# Patient Record
Sex: MALE | Race: WHITE | NOT HISPANIC OR LATINO | Employment: OTHER | URBAN - METROPOLITAN AREA
[De-identification: names, ages, dates, MRNs, and addresses within clinical notes are randomized per-mention and may not be internally consistent; named-entity substitution may affect disease eponyms.]

---

## 2023-05-16 RX ORDER — DAPAGLIFLOZIN 10 MG/1
5 TABLET, FILM COATED ORAL DAILY
COMMUNITY
Start: 2021-10-13 | End: 2024-05-28 | Stop reason: SDUPTHER

## 2023-05-16 RX ORDER — ATORVASTATIN CALCIUM 20 MG/1
1 TABLET, FILM COATED ORAL NIGHTLY
COMMUNITY
Start: 2021-07-16 | End: 2024-05-28 | Stop reason: SDUPTHER

## 2023-05-16 RX ORDER — GLIMEPIRIDE 4 MG/1
1 TABLET ORAL 2 TIMES DAILY
COMMUNITY
Start: 2020-04-24 | End: 2024-05-28 | Stop reason: SDUPTHER

## 2023-05-16 RX ORDER — ALOGLIPTIN 25 MG/1
1 TABLET, FILM COATED ORAL DAILY
COMMUNITY
Start: 2020-09-10 | End: 2024-05-28 | Stop reason: SDUPTHER

## 2023-05-19 ENCOUNTER — LAB (OUTPATIENT)
Dept: LAB | Facility: LAB | Age: 68
End: 2023-05-19
Payer: MEDICARE

## 2023-05-19 ENCOUNTER — OFFICE VISIT (OUTPATIENT)
Dept: PRIMARY CARE | Facility: CLINIC | Age: 68
End: 2023-05-19
Payer: MEDICARE

## 2023-05-19 VITALS
SYSTOLIC BLOOD PRESSURE: 128 MMHG | DIASTOLIC BLOOD PRESSURE: 60 MMHG | BODY MASS INDEX: 27.43 KG/M2 | HEART RATE: 70 BPM | TEMPERATURE: 97.2 F | OXYGEN SATURATION: 96 % | WEIGHT: 191.6 LBS | HEIGHT: 70 IN

## 2023-05-19 DIAGNOSIS — Z00.00 MEDICARE ANNUAL WELLNESS VISIT, SUBSEQUENT: Primary | ICD-10-CM

## 2023-05-19 DIAGNOSIS — D12.6 ADENOMATOUS POLYP OF COLON, UNSPECIFIED PART OF COLON: ICD-10-CM

## 2023-05-19 DIAGNOSIS — Z12.5 SCREENING FOR MALIGNANT NEOPLASM OF PROSTATE: ICD-10-CM

## 2023-05-19 DIAGNOSIS — E11.9 TYPE 2 DIABETES MELLITUS WITHOUT COMPLICATION, WITHOUT LONG-TERM CURRENT USE OF INSULIN (MULTI): ICD-10-CM

## 2023-05-19 PROBLEM — M51.369 DEGENERATION OF INTERVERTEBRAL DISC OF LUMBAR REGION: Status: ACTIVE | Noted: 2023-05-19

## 2023-05-19 PROBLEM — M51.36 DEGENERATION OF INTERVERTEBRAL DISC OF LUMBAR REGION: Status: ACTIVE | Noted: 2023-05-19

## 2023-05-19 PROBLEM — K63.5 COLON POLYP: Status: ACTIVE | Noted: 2023-05-19

## 2023-05-19 PROBLEM — M48.07 SPINAL STENOSIS OF LUMBOSACRAL REGION: Status: ACTIVE | Noted: 2023-05-19

## 2023-05-19 PROBLEM — I73.9 CLAUDICATION OF LOWER EXTREMITY (CMS-HCC): Status: ACTIVE | Noted: 2023-05-19

## 2023-05-19 PROBLEM — M47.816 LUMBAR SPONDYLOSIS: Status: ACTIVE | Noted: 2023-05-19

## 2023-05-19 LAB
ALBUMIN (MG/L) IN URINE: <7 MG/L
ALBUMIN/CREATININE (UG/MG) IN URINE: NORMAL UG/MG CRT (ref 0–30)
ANION GAP IN SER/PLAS: 13 MMOL/L (ref 10–20)
CALCIUM (MG/DL) IN SER/PLAS: 9.2 MG/DL (ref 8.6–10.3)
CARBON DIOXIDE, TOTAL (MMOL/L) IN SER/PLAS: 26 MMOL/L (ref 21–32)
CHLORIDE (MMOL/L) IN SER/PLAS: 104 MMOL/L (ref 98–107)
CHOLESTEROL (MG/DL) IN SER/PLAS: 119 MG/DL (ref 0–199)
CHOLESTEROL IN HDL (MG/DL) IN SER/PLAS: 48.4 MG/DL
CHOLESTEROL/HDL RATIO: 2.5
CREATININE (MG/DL) IN SER/PLAS: 0.98 MG/DL (ref 0.5–1.3)
CREATININE (MG/DL) IN URINE: 113 MG/DL (ref 20–370)
ESTIMATED AVERAGE GLUCOSE FOR HBA1C: 137 MG/DL
GFR MALE: 83 ML/MIN/1.73M2
GLUCOSE (MG/DL) IN SER/PLAS: 110 MG/DL (ref 74–99)
HEMOGLOBIN A1C/HEMOGLOBIN TOTAL IN BLOOD: 6.4 %
LDL: 61 MG/DL (ref 0–99)
POTASSIUM (MMOL/L) IN SER/PLAS: 4 MMOL/L (ref 3.5–5.3)
PROSTATE SPECIFIC ANTIGEN,SCREEN: 0.77 NG/ML (ref 0–4)
SODIUM (MMOL/L) IN SER/PLAS: 139 MMOL/L (ref 136–145)
TRIGLYCERIDE (MG/DL) IN SER/PLAS: 48 MG/DL (ref 0–149)
UREA NITROGEN (MG/DL) IN SER/PLAS: 21 MG/DL (ref 6–23)
VLDL: 10 MG/DL (ref 0–40)

## 2023-05-19 PROCEDURE — 3074F SYST BP LT 130 MM HG: CPT | Performed by: INTERNAL MEDICINE

## 2023-05-19 PROCEDURE — 1036F TOBACCO NON-USER: CPT | Performed by: INTERNAL MEDICINE

## 2023-05-19 PROCEDURE — 3078F DIAST BP <80 MM HG: CPT | Performed by: INTERNAL MEDICINE

## 2023-05-19 PROCEDURE — 84153 ASSAY OF PSA TOTAL: CPT

## 2023-05-19 PROCEDURE — G0439 PPPS, SUBSEQ VISIT: HCPCS | Performed by: INTERNAL MEDICINE

## 2023-05-19 PROCEDURE — 36415 COLL VENOUS BLD VENIPUNCTURE: CPT

## 2023-05-19 PROCEDURE — 1170F FXNL STATUS ASSESSED: CPT | Performed by: INTERNAL MEDICINE

## 2023-05-19 PROCEDURE — 99213 OFFICE O/P EST LOW 20 MIN: CPT | Performed by: INTERNAL MEDICINE

## 2023-05-19 ASSESSMENT — ACTIVITIES OF DAILY LIVING (ADL)
DOING_HOUSEWORK: INDEPENDENT
GROCERY_SHOPPING: INDEPENDENT
BATHING: INDEPENDENT
TAKING_MEDICATION: INDEPENDENT
MANAGING_FINANCES: INDEPENDENT
DRESSING: INDEPENDENT

## 2023-05-19 ASSESSMENT — ENCOUNTER SYMPTOMS
HEMATOLOGIC/LYMPHATIC NEGATIVE: 1
LOSS OF SENSATION IN FEET: 0
OCCASIONAL FEELINGS OF UNSTEADINESS: 0
CONSTITUTIONAL NEGATIVE: 1
EYES NEGATIVE: 1
DEPRESSION: 0
ENDOCRINE NEGATIVE: 1
CARDIOVASCULAR NEGATIVE: 1
ALLERGIC/IMMUNOLOGIC NEGATIVE: 1
MUSCULOSKELETAL NEGATIVE: 1
NEUROLOGICAL NEGATIVE: 1
RESPIRATORY NEGATIVE: 1
GASTROINTESTINAL NEGATIVE: 1
PSYCHIATRIC NEGATIVE: 1

## 2023-05-19 NOTE — PROGRESS NOTES
"Subjective   Patient ID: Phil Dickey is a 68 y.o. male who presents for Medicare Annual Wellness Visit Subsequent.  Patient presents today for Medicare wellness exam.    He continues follow up with Endo for management of DM.  He had labs drawn this morning for Endo as well.    He had colonoscopy in 8/2022 with finding of 2 tubular adenomas.  He will follow up with GI as indicated.    [facility]  PSA in 5/2022 was at goal level as scanned into the chart.    He voices no new or acute complaints today otherwise.        Review of Systems   Constitutional: Negative.    HENT: Negative.     Eyes: Negative.    Respiratory: Negative.     Cardiovascular: Negative.    Gastrointestinal: Negative.    Endocrine: Negative.    Genitourinary: Negative.    Musculoskeletal: Negative.    Skin: Negative.    Allergic/Immunologic: Negative.    Neurological: Negative.    Hematological: Negative.    Psychiatric/Behavioral: Negative.         Objective     Blood pressure 128/60, pulse 70, temperature 36.2 °C (97.2 °F), temperature source Temporal, height 1.778 m (5' 10\"), weight 86.9 kg (191 lb 9.6 oz), SpO2 96 %.   Physical Exam  Constitutional:       Appearance: Normal appearance.   Neck:      Vascular: No carotid bruit.   Cardiovascular:      Rate and Rhythm: Normal rate and regular rhythm.      Pulses: Normal pulses.      Heart sounds: Normal heart sounds. No murmur heard.  Pulmonary:      Effort: Pulmonary effort is normal.      Breath sounds: Normal breath sounds. No wheezing or rales.   Abdominal:      General: Abdomen is flat. There is no distension.      Palpations: Abdomen is soft.      Tenderness: There is no abdominal tenderness.   Musculoskeletal:         General: Normal range of motion.      Cervical back: Normal range of motion and neck supple.   Skin:     General: Skin is warm and dry.   Neurological:      General: No focal deficit present.      Mental Status: He is alert and oriented to " person, place, and time.   Psychiatric:         Mood and Affect: Mood normal.         Behavior: Behavior normal.         Assessment/Plan   Problem List Items Addressed This Visit          Digestive    Colon polyp       Endocrine/Metabolic    Diabetes mellitus, type 2 (CMS/HCC)       Other    Medicare annual wellness visit, subsequent - Primary     Other Visit Diagnoses       Screening for malignant neoplasm of prostate              Medicare annual wellness completed with no new findings or issues identified.  Patient has documented advanced care planning and POA in place  DM managed by Endo.  [facility]  Follow up colonoscopy as directed by GI which will be 5 years according to the report that I reviewed.  All of the above are reviewed with him.  He states he is no longer following up with [facility].    Follow up annually or prn if needed

## 2024-05-28 ENCOUNTER — OFFICE VISIT (OUTPATIENT)
Dept: PRIMARY CARE | Facility: CLINIC | Age: 69
End: 2024-05-28
Payer: MEDICARE

## 2024-05-28 ENCOUNTER — OFFICE VISIT (OUTPATIENT)
Dept: ENDOCRINOLOGY | Facility: CLINIC | Age: 69
End: 2024-05-28
Payer: MEDICARE

## 2024-05-28 VITALS
SYSTOLIC BLOOD PRESSURE: 126 MMHG | BODY MASS INDEX: 26.6 KG/M2 | HEART RATE: 61 BPM | HEIGHT: 71 IN | WEIGHT: 190 LBS | DIASTOLIC BLOOD PRESSURE: 68 MMHG

## 2024-05-28 VITALS
BODY MASS INDEX: 28.21 KG/M2 | HEART RATE: 68 BPM | TEMPERATURE: 95.7 F | WEIGHT: 196.6 LBS | DIASTOLIC BLOOD PRESSURE: 58 MMHG | OXYGEN SATURATION: 96 % | SYSTOLIC BLOOD PRESSURE: 124 MMHG

## 2024-05-28 DIAGNOSIS — Z00.00 MEDICARE ANNUAL WELLNESS VISIT, SUBSEQUENT: Primary | ICD-10-CM

## 2024-05-28 DIAGNOSIS — E11.9 TYPE 2 DIABETES MELLITUS WITHOUT COMPLICATION, WITHOUT LONG-TERM CURRENT USE OF INSULIN (MULTI): Primary | ICD-10-CM

## 2024-05-28 DIAGNOSIS — E11.9 TYPE 2 DIABETES MELLITUS WITHOUT COMPLICATION, WITHOUT LONG-TERM CURRENT USE OF INSULIN (MULTI): ICD-10-CM

## 2024-05-28 DIAGNOSIS — E78.49 OTHER HYPERLIPIDEMIA: ICD-10-CM

## 2024-05-28 DIAGNOSIS — Z12.5 SCREENING FOR MALIGNANT NEOPLASM OF PROSTATE: ICD-10-CM

## 2024-05-28 DIAGNOSIS — I73.9 CLAUDICATION OF LOWER EXTREMITY (CMS-HCC): ICD-10-CM

## 2024-05-28 PROCEDURE — 1036F TOBACCO NON-USER: CPT | Performed by: INTERNAL MEDICINE

## 2024-05-28 PROCEDURE — G0442 ANNUAL ALCOHOL SCREEN 15 MIN: HCPCS | Performed by: INTERNAL MEDICINE

## 2024-05-28 PROCEDURE — 3078F DIAST BP <80 MM HG: CPT | Performed by: INTERNAL MEDICINE

## 2024-05-28 PROCEDURE — 1170F FXNL STATUS ASSESSED: CPT | Performed by: INTERNAL MEDICINE

## 2024-05-28 PROCEDURE — 1123F ACP DISCUSS/DSCN MKR DOCD: CPT | Performed by: INTERNAL MEDICINE

## 2024-05-28 PROCEDURE — G0439 PPPS, SUBSEQ VISIT: HCPCS | Performed by: INTERNAL MEDICINE

## 2024-05-28 PROCEDURE — 1159F MED LIST DOCD IN RCRD: CPT | Performed by: INTERNAL MEDICINE

## 2024-05-28 PROCEDURE — 1157F ADVNC CARE PLAN IN RCRD: CPT | Performed by: INTERNAL MEDICINE

## 2024-05-28 PROCEDURE — 1158F ADVNC CARE PLAN TLK DOCD: CPT | Performed by: INTERNAL MEDICINE

## 2024-05-28 PROCEDURE — 1160F RVW MEDS BY RX/DR IN RCRD: CPT | Performed by: INTERNAL MEDICINE

## 2024-05-28 PROCEDURE — 3074F SYST BP LT 130 MM HG: CPT | Performed by: INTERNAL MEDICINE

## 2024-05-28 PROCEDURE — G0444 DEPRESSION SCREEN ANNUAL: HCPCS | Performed by: INTERNAL MEDICINE

## 2024-05-28 PROCEDURE — 99214 OFFICE O/P EST MOD 30 MIN: CPT | Performed by: INTERNAL MEDICINE

## 2024-05-28 RX ORDER — ALOGLIPTIN 25 MG/1
25 TABLET, FILM COATED ORAL DAILY
Qty: 30 TABLET | Refills: 5 | Status: SHIPPED | OUTPATIENT
Start: 2024-05-28 | End: 2024-11-24

## 2024-05-28 RX ORDER — GLIMEPIRIDE 4 MG/1
4 TABLET ORAL 2 TIMES DAILY
Qty: 60 TABLET | Refills: 5 | Status: SHIPPED | OUTPATIENT
Start: 2024-05-28 | End: 2024-11-24

## 2024-05-28 RX ORDER — DAPAGLIFLOZIN 10 MG/1
5 TABLET, FILM COATED ORAL DAILY
Qty: 15 TABLET | Refills: 5 | Status: SHIPPED | OUTPATIENT
Start: 2024-05-28 | End: 2024-11-24

## 2024-05-28 RX ORDER — ATORVASTATIN CALCIUM 20 MG/1
20 TABLET, FILM COATED ORAL NIGHTLY
Qty: 30 TABLET | Refills: 5 | Status: SHIPPED | OUTPATIENT
Start: 2024-05-28 | End: 2024-11-24

## 2024-05-28 ASSESSMENT — ENCOUNTER SYMPTOMS
RESPIRATORY NEGATIVE: 1
ALLERGIC/IMMUNOLOGIC NEGATIVE: 1
CONSTITUTIONAL NEGATIVE: 1
LOSS OF SENSATION IN FEET: 0
HEMATOLOGIC/LYMPHATIC NEGATIVE: 1
ENDOCRINE NEGATIVE: 1
MUSCULOSKELETAL NEGATIVE: 1
GASTROINTESTINAL NEGATIVE: 1
NEUROLOGICAL NEGATIVE: 1
EYES NEGATIVE: 1
ROS SKIN COMMENTS: DRY HANDS
OCCASIONAL FEELINGS OF UNSTEADINESS: 0
DEPRESSION: 0
BACK PAIN: 0
CARDIOVASCULAR NEGATIVE: 1
PSYCHIATRIC NEGATIVE: 1

## 2024-05-28 ASSESSMENT — ACTIVITIES OF DAILY LIVING (ADL)
BATHING: INDEPENDENT
DOING_HOUSEWORK: INDEPENDENT
DRESSING: INDEPENDENT
TAKING_MEDICATION: INDEPENDENT
MANAGING_FINANCES: INDEPENDENT
GROCERY_SHOPPING: INDEPENDENT

## 2024-05-28 ASSESSMENT — PATIENT HEALTH QUESTIONNAIRE - PHQ9
2. FEELING DOWN, DEPRESSED OR HOPELESS: NOT AT ALL
SUM OF ALL RESPONSES TO PHQ9 QUESTIONS 1 AND 2: 0
1. LITTLE INTEREST OR PLEASURE IN DOING THINGS: NOT AT ALL

## 2024-05-28 NOTE — PROGRESS NOTES
Subjective   Patient ID: Phil Dickey is a 69 y.o. male who presents for Medicare Annual Wellness Visit Subsequent.  Patient presents today for Medicare wellness exam.    He continues follow up with Endo for management of DM.  He had labs drawn this morning for Endo as well.    He had colonoscopy in 8/2022 with finding of 2 tubular adenomas.  He will follow up with GI as indicated.    [facility].  PSA in 5/2022 was at goal level as scanned into the chart.    He voices no new or acute complaints today otherwise.        Review of Systems   Constitutional: Negative.    HENT: Negative.     Eyes: Negative.    Respiratory: Negative.     Cardiovascular: Negative.    Gastrointestinal: Negative.    Endocrine: Negative.    Genitourinary: Negative.    Musculoskeletal: Negative.    Skin: Negative.    Allergic/Immunologic: Negative.    Neurological: Negative.    Hematological: Negative.    Psychiatric/Behavioral: Negative.         Objective     Blood pressure 124/58, pulse 68, temperature 35.4 °C (95.7 °F), temperature source Temporal, weight 89.2 kg (196 lb 9.6 oz), SpO2 96%.   Physical Exam  Constitutional:       Appearance: Normal appearance.   Neck:      Vascular: No carotid bruit.   Cardiovascular:      Rate and Rhythm: Normal rate and regular rhythm.      Pulses: Normal pulses.      Heart sounds: Normal heart sounds. No murmur heard.  Pulmonary:      Effort: Pulmonary effort is normal.      Breath sounds: Normal breath sounds. No wheezing or rales.   Abdominal:      General: Abdomen is flat. There is no distension.      Palpations: Abdomen is soft.      Tenderness: There is no abdominal tenderness.   Musculoskeletal:         General: Normal range of motion.      Cervical back: Normal range of motion and neck supple.   Skin:     General: Skin is warm and dry.   Neurological:      General: No focal deficit present.      Mental Status: He is alert and oriented to person, place, and time.    Psychiatric:         Mood and Affect: Mood normal.         Behavior: Behavior normal.         Assessment/Plan   Problem List Items Addressed This Visit       Claudication of lower extremity (CMS-HCC)    Diabetes mellitus, type 2 (Multi)    Medicare annual wellness visit, subsequent - Primary     Other Visit Diagnoses       Screening for malignant neoplasm of prostate        Relevant Orders    Prostate Specific Antigen, Screen          Medicare annual wellness completed with no new findings or issues identified.  Patient has documented advanced care planning and POA in place  Depression screen is completed utilizing PHQ-2 with score of zero (0).  Alcohol screen is completed with no issues identified.   DM managed by Endo.  Labs per Endo as well as PSA reviewed with no issues identified.  Follow up colonoscopy as directed by GI which will be 5 years according to the report that I reviewed.  All of the above are reviewed with him.  He states he is to [facility] by [facility] in the next day or so.    Follow up annually or prn if needed

## 2024-05-28 NOTE — PATIENT INSTRUCTIONS
Thank you for choosing [facility]  for your health care needs.  If you have any questions, concerns or medical needs, please feel free to contact our office at [phone].    Please ensure you complete your blood work one week before the next scheduled appointment.     To continue Alogliptin 25mg once daily  To continue Glimepiride 4mg twice daily before breakfast and dinner   To continue Farxiga 10mg once daily   Please check blood sugars once daily and keep a detailed log  [facility]   For follow up in 12 months

## 2024-05-29 ENCOUNTER — TELEPHONE (OUTPATIENT)
Dept: ENDOCRINOLOGY | Facility: CLINIC | Age: 69
End: 2024-05-29

## 2024-05-30 NOTE — ASSESSMENT & PLAN NOTE
To continue Alogliptin 25mg once daily  To continue Glimepiride 4mg twice daily before breakfast and dinner   To continue Farxiga 10mg once daily   Please check blood sugars once daily and keep a detailed log  To obtain blood work today [facility]

## 2024-06-06 ENCOUNTER — TELEPHONE (OUTPATIENT)
Dept: PRIMARY CARE | Facility: CLINIC | Age: 69
End: 2024-06-06

## 2024-06-06 ENCOUNTER — LAB (OUTPATIENT)
Dept: LAB | Facility: LAB | Age: 69
End: 2024-06-06
Payer: MEDICARE

## 2024-06-06 DIAGNOSIS — E11.51 TYPE 2 DIABETES MELLITUS WITH DIABETIC PERIPHERAL ANGIOPATHY WITHOUT GANGRENE (MULTI): ICD-10-CM

## 2024-06-06 DIAGNOSIS — E11.51 TYPE 2 DIABETES MELLITUS WITH DIABETIC PERIPHERAL ANGIOPATHY WITHOUT GANGRENE (MULTI): Primary | ICD-10-CM

## 2024-06-06 DIAGNOSIS — E11.9 TYPE 2 DIABETES MELLITUS WITHOUT COMPLICATION, WITHOUT LONG-TERM CURRENT USE OF INSULIN (MULTI): ICD-10-CM

## 2024-06-06 LAB
ALBUMIN SERPL BCP-MCNC: 4.4 G/DL (ref 3.4–5)
ALP SERPL-CCNC: 91 U/L (ref 33–136)
ALT SERPL W P-5'-P-CCNC: 22 U/L (ref 10–52)
ANION GAP SERPL CALC-SCNC: 11 MMOL/L (ref 10–20)
AST SERPL W P-5'-P-CCNC: 17 U/L (ref 9–39)
BILIRUB SERPL-MCNC: 0.9 MG/DL (ref 0–1.2)
BUN SERPL-MCNC: 18 MG/DL (ref 6–23)
CALCIUM SERPL-MCNC: 9 MG/DL (ref 8.6–10.3)
CHLORIDE SERPL-SCNC: 107 MMOL/L (ref 98–107)
CHOLEST SERPL-MCNC: 109 MG/DL (ref 0–199)
CHOLESTEROL/HDL RATIO: 2.4
CO2 SERPL-SCNC: 27 MMOL/L (ref 21–32)
CREAT SERPL-MCNC: 0.95 MG/DL (ref 0.5–1.3)
CREAT UR-MCNC: 142.7 MG/DL (ref 20–370)
CREAT UR-MCNC: 148.8 MG/DL (ref 20–370)
EGFRCR SERPLBLD CKD-EPI 2021: 86 ML/MIN/1.73M*2
EST. AVERAGE GLUCOSE BLD GHB EST-MCNC: 148 MG/DL
GLUCOSE SERPL-MCNC: 151 MG/DL (ref 74–99)
HBA1C MFR BLD: 6.8 %
HDLC SERPL-MCNC: 45.4 MG/DL
LDLC SERPL CALC-MCNC: 52 MG/DL
MICROALBUMIN UR-MCNC: 14.1 MG/L
MICROALBUMIN/CREAT UR: 9.9 UG/MG CREAT
NON HDL CHOLESTEROL: 64 MG/DL (ref 0–149)
POTASSIUM SERPL-SCNC: 4 MMOL/L (ref 3.5–5.3)
PROT SERPL-MCNC: 6.9 G/DL (ref 6.4–8.2)
SODIUM SERPL-SCNC: 141 MMOL/L (ref 136–145)
TRIGL SERPL-MCNC: 58 MG/DL (ref 0–149)
VLDL: 12 MG/DL (ref 0–40)

## 2024-06-06 PROCEDURE — 80061 LIPID PANEL: CPT

## 2024-06-06 PROCEDURE — 36415 COLL VENOUS BLD VENIPUNCTURE: CPT

## 2024-06-06 PROCEDURE — 80053 COMPREHEN METABOLIC PANEL: CPT

## 2024-06-06 PROCEDURE — 82570 ASSAY OF URINE CREATININE: CPT

## 2024-06-06 PROCEDURE — 83036 HEMOGLOBIN GLYCOSYLATED A1C: CPT

## 2024-06-06 PROCEDURE — 82043 UR ALBUMIN QUANTITATIVE: CPT

## 2024-06-06 NOTE — TELEPHONE ENCOUNTER
Harrison from [facility] called in and wanted to let you know that she is unable to release the PSA lab due to a coding issue. She is unsure what the correct code is but is requesting this be re-ordered if possible. Please advise.

## 2024-06-26 ENCOUNTER — TELEPHONE (OUTPATIENT)
Dept: ENDOCRINOLOGY | Facility: CLINIC | Age: 69
End: 2024-06-26
Payer: MEDICARE

## 2024-06-26 NOTE — TELEPHONE ENCOUNTER
Patient called to advise that he had a few medication changes.  He states that when he gets the prescriptions [facility] the [facility], they will be free for him.  The [facility] doctor changed the Farxiga to Jardiance 25 mg and Alogliptin to Januvia 25 mg.  He states that he still has 6 months left of the Farxiga at home, so he can wait to start the Jardiance and use up the rest of the Farxiga.  He will need to start the Januvia soon because he is almost out of the Alogliptin.    He wanted to let you know about these changes and see if you had any concerns with the changes.  If you prefer that he remain on the Farxiga and the Alogliptin he will just have to pay for them.  He is hoping that you agree with the changes.  He would like to be advised so he can start the Januvia next week. The [facility] doctor kept him on the Glimepiride and Atorvastatin as prescribed by you.   (I updated the med list to reflect the Januvia, but I left the Farxiga on the med list because he plans to be on it for 6 more months)

## 2024-07-03 NOTE — TELEPHONE ENCOUNTER
Phil BERNAL Do [facility] 3[facility] End[facility]1 Clinical Support Staff (supporting Fidel Blackman MD)         7/2/24  5:48 PM  [facility] me to Sipagliptin 25 Mg from Alogliptin. 25 MG. Is this okay? The [facility] switched my Fariga 5 MG to Empaglifllozin 25 MG. I assume I need to cut that pill but I don't know how much. I have enough  Fariga to last me until November. I called your office last week and never heard back. I'm out of Alogliptin in 2 days.

## 2024-07-05 DIAGNOSIS — E11.9 TYPE 2 DIABETES MELLITUS WITHOUT COMPLICATION, WITHOUT LONG-TERM CURRENT USE OF INSULIN (MULTI): Primary | ICD-10-CM

## 2025-05-19 ENCOUNTER — TELEPHONE (OUTPATIENT)
Dept: PRIMARY CARE | Facility: CLINIC | Age: 70
End: 2025-05-19
Payer: MEDICARE

## 2025-05-21 ENCOUNTER — TELEPHONE (OUTPATIENT)
Dept: ENDOCRINOLOGY | Facility: CLINIC | Age: 70
End: 2025-05-21
Payer: MEDICARE

## 2025-05-29 DIAGNOSIS — E11.9 TYPE 2 DIABETES MELLITUS WITHOUT COMPLICATION, WITHOUT LONG-TERM CURRENT USE OF INSULIN: Primary | ICD-10-CM

## 2025-05-29 DIAGNOSIS — E78.49 OTHER HYPERLIPIDEMIA: ICD-10-CM

## 2025-05-29 DIAGNOSIS — Z12.5 SCREENING FOR MALIGNANT NEOPLASM OF PROSTATE: Primary | ICD-10-CM

## 2025-05-30 LAB — PSA SERPL-MCNC: 0.9 NG/ML

## 2025-05-31 LAB
ALBUMIN SERPL-MCNC: 4.4 G/DL (ref 3.6–5.1)
ALBUMIN/CREAT UR: NORMAL MG/G CREAT
ALP SERPL-CCNC: 83 U/L (ref 35–144)
ALT SERPL-CCNC: 22 U/L (ref 9–46)
ANION GAP SERPL CALCULATED.4IONS-SCNC: 9 MMOL/L (CALC) (ref 7–17)
AST SERPL-CCNC: 18 U/L (ref 10–35)
BILIRUB SERPL-MCNC: 1 MG/DL (ref 0.2–1.2)
BUN SERPL-MCNC: 18 MG/DL (ref 7–25)
CALCIUM SERPL-MCNC: 9.2 MG/DL (ref 8.6–10.3)
CHLORIDE SERPL-SCNC: 107 MMOL/L (ref 98–110)
CHOLEST SERPL-MCNC: 126 MG/DL
CHOLEST/HDLC SERPL: 2.6 (CALC)
CO2 SERPL-SCNC: 25 MMOL/L (ref 20–32)
CREAT SERPL-MCNC: 0.94 MG/DL (ref 0.7–1.28)
CREAT UR-MCNC: 89 MG/DL (ref 20–320)
EGFRCR SERPLBLD CKD-EPI 2021: 86 ML/MIN/1.73M2
EST. AVERAGE GLUCOSE BLD GHB EST-MCNC: 157 MG/DL
EST. AVERAGE GLUCOSE BLD GHB EST-SCNC: 8.7 MMOL/L
GLUCOSE SERPL-MCNC: 133 MG/DL (ref 65–99)
HBA1C MFR BLD: 7.1 %
HDLC SERPL-MCNC: 48 MG/DL
LDLC SERPL CALC-MCNC: 64 MG/DL (CALC)
MICROALBUMIN UR-MCNC: <0.2 MG/DL
NONHDLC SERPL-MCNC: 78 MG/DL (CALC)
POTASSIUM SERPL-SCNC: 4.2 MMOL/L (ref 3.5–5.3)
PROT SERPL-MCNC: 7.2 G/DL (ref 6.1–8.1)
SODIUM SERPL-SCNC: 141 MMOL/L (ref 135–146)
TRIGL SERPL-MCNC: 63 MG/DL

## 2025-06-02 NOTE — PROGRESS NOTES
"Subjective   Phil Dickey is a 70 y.o. male who presents for follow-up of Type 2 diabetes mellitus. The initial diagnosis of diabetes was made in July 2019. The patient was last seen in April 2022.      He has had no major changes to his health since his last appointment.  [place] live in the .      Known complications due to diabetes included peripheral vascular disease    Cardiovascular risk factors include advanced age (older than 55 for men, 65 for women), diabetes mellitus, and male gender. The patient is not on an ACE inhibitor or angiotensin II receptor blocker.  The patient has not been previously hospitalized due to diabetic ketoacidosis.     Current symptoms/problems include none. His clinical course has been stable.     The patient is not currently checking the blood glucose.    Patient is using: no devices     Hypoglycemia frequency: Denies   Hypoglycemia awareness: N/A      Current Medication Regimen  Januvia 100mg once daily  Glimepiride 4mg twice daily before breakfast and dinner   Jardiance 25mg once daily     He can forget to take statin      MEALS:   Breakfast  - scrambled eggs, plantains, 2 slices of salami  Lunch - omits   Dinner - chicken with salad   Snacks - popcorn, dragon fruit, efra, oranges, apples  Beverages - water, coke zero, occasional tea      Quit tobacco use in 1995     Review of Systems   HENT:          Dry mouth    Respiratory:  Negative for shortness of breath.    Cardiovascular:  Negative for chest pain.   Genitourinary:         Nocturia x 1-2   Musculoskeletal:  Positive for arthralgias (Right shoulder).   Skin:         Itchy    Neurological:  Negative for dizziness and light-headedness.   All other systems reviewed and are negative.      Objective   /68   Pulse 60   Ht 1.803 m (5' 11\")   Wt 87.6 kg (193 lb 3.2 oz)   BMI 26.95 kg/m²   Physical Exam  Vitals and nursing note reviewed.   Constitutional:       General: He is not in acute distress.     " Appearance: Normal appearance. He is normal weight.   HENT:      Head: Normocephalic and atraumatic.      Nose: Nose normal.      Mouth/Throat:      Mouth: Mucous membranes are moist.   Eyes:      Extraocular Movements: Extraocular movements intact.   Cardiovascular:      Rate and Rhythm: Normal rate and regular rhythm.   Pulmonary:      Effort: Pulmonary effort is normal.      Breath sounds: Normal breath sounds.   Musculoskeletal:         General: Normal range of motion.   Skin:     General: Skin is warm.   Neurological:      Mental Status: He is alert and oriented to person, place, and time.   Psychiatric:         Mood and Affect: Mood normal.       Lab Review  GLUCOSE (mg/dL)   Date Value   05/30/2025 133 (H)     Glucose (mg/dL)   Date Value   06/06/2024 151 (H)   05/19/2023 110 (H)   10/06/2021 144 (H)   07/13/2021 136 (H)     HEMOGLOBIN A1c (%)   Date Value   05/30/2025 7.1 (H)     Hemoglobin A1C (%)   Date Value   06/06/2024 6.8 (H)   05/19/2023 6.4 (A)   10/06/2021 7.5 (A)   07/13/2021 7.4     CARBON DIOXIDE (mmol/L)   Date Value   05/30/2025 25     Bicarbonate (mmol/L)   Date Value   06/06/2024 27   05/19/2023 26   10/06/2021 27   07/13/2021 28     UREA NITROGEN (BUN) (mg/dL)   Date Value   05/30/2025 18     Urea Nitrogen (mg/dL)   Date Value   06/06/2024 18   05/19/2023 21   10/06/2021 20   07/13/2021 20     Creatinine (mg/dL)   Date Value   06/06/2024 0.95   05/19/2023 0.98   10/06/2021 1.10   07/13/2021 0.97     CREATININE (mg/dL)   Date Value   05/30/2025 0.94     Lab Results   Component Value Date    CHOL 126 05/30/2025    CHOL 109 06/06/2024    CHOL 119 05/19/2023     Lab Results   Component Value Date    HDL 48 05/30/2025    HDL 45.4 06/06/2024    HDL 48.4 05/19/2023     Lab Results   Component Value Date    LDLCALC 64 05/30/2025    LDLCALC 52 06/06/2024     Lab Results   Component Value Date    TRIG 63 05/30/2025    TRIG 58 06/06/2024    TRIG 48 05/19/2023     Health Maintenance:   Foot Exam:  October 2021  Eye Exam:  May 28, 2024  Urine Albumin:  May 30, 2025    Assessment/Plan   70-year-old male presents for follow up for type 2 diabetes. His blood pressure is at goal. He is noted to be on a statin    Diabetes mellitus, type 2 (Multi)  To continue Januvia 100mg once daily  To continue Glimepiride 4mg twice daily before breakfast and dinner   To continue Jardiance 25mg once daily   For a diabetic dilated eye examination  Counseled that the goal A1C should be 7% or less  Counseled glycemic control is warranted to prevent microvascular complications  For follow up in 12 months

## 2025-06-02 NOTE — PATIENT INSTRUCTIONS
Thank you for choosing [facility]  for your health care needs.  If you have any questions, concerns or medical needs, please feel free to contact our office at [phone].    Please ensure you complete your blood work one week before the next scheduled appointment.     To continue Januvia 100mg once daily  To continue Glimepiride 4mg twice daily before breakfast and dinner   To continue Jardiance 25mg once daily   For a diabetic dilated eye examination  For follow up in 12 months

## 2025-06-03 ENCOUNTER — APPOINTMENT (OUTPATIENT)
Dept: ENDOCRINOLOGY | Facility: CLINIC | Age: 70
End: 2025-06-03
Payer: MEDICARE

## 2025-06-03 ENCOUNTER — HOSPITAL ENCOUNTER (OUTPATIENT)
Dept: RADIOLOGY | Facility: CLINIC | Age: 70
Discharge: HOME | End: 2025-06-03
Payer: MEDICARE

## 2025-06-03 ENCOUNTER — APPOINTMENT (OUTPATIENT)
Dept: PRIMARY CARE | Facility: CLINIC | Age: 70
End: 2025-06-03
Payer: MEDICARE

## 2025-06-03 VITALS
WEIGHT: 193.7 LBS | OXYGEN SATURATION: 96 % | HEART RATE: 69 BPM | DIASTOLIC BLOOD PRESSURE: 60 MMHG | BODY MASS INDEX: 27.02 KG/M2 | TEMPERATURE: 95.7 F | SYSTOLIC BLOOD PRESSURE: 100 MMHG

## 2025-06-03 VITALS
HEART RATE: 60 BPM | DIASTOLIC BLOOD PRESSURE: 68 MMHG | BODY MASS INDEX: 27.05 KG/M2 | HEIGHT: 71 IN | WEIGHT: 193.2 LBS | SYSTOLIC BLOOD PRESSURE: 112 MMHG

## 2025-06-03 DIAGNOSIS — Z12.11 SCREENING FOR COLORECTAL CANCER: ICD-10-CM

## 2025-06-03 DIAGNOSIS — Z00.00 MEDICARE ANNUAL WELLNESS VISIT, SUBSEQUENT: Primary | ICD-10-CM

## 2025-06-03 DIAGNOSIS — Z12.12 SCREENING FOR COLORECTAL CANCER: ICD-10-CM

## 2025-06-03 DIAGNOSIS — D12.6 ADENOMATOUS POLYP OF COLON, UNSPECIFIED PART OF COLON: ICD-10-CM

## 2025-06-03 DIAGNOSIS — E11.9 TYPE 2 DIABETES MELLITUS WITHOUT COMPLICATION, WITHOUT LONG-TERM CURRENT USE OF INSULIN: ICD-10-CM

## 2025-06-03 DIAGNOSIS — Z12.5 SCREENING FOR MALIGNANT NEOPLASM OF PROSTATE: ICD-10-CM

## 2025-06-03 DIAGNOSIS — E78.49 OTHER HYPERLIPIDEMIA: ICD-10-CM

## 2025-06-03 DIAGNOSIS — M25.511 RIGHT SHOULDER PAIN, UNSPECIFIED CHRONICITY: ICD-10-CM

## 2025-06-03 LAB — POC FINGERSTICK BLOOD GLUCOSE: 108 MG/DL (ref 70–100)

## 2025-06-03 PROCEDURE — 73030 X-RAY EXAM OF SHOULDER: CPT | Mod: RIGHT SIDE | Performed by: RADIOLOGY

## 2025-06-03 PROCEDURE — 82962 GLUCOSE BLOOD TEST: CPT | Performed by: INTERNAL MEDICINE

## 2025-06-03 PROCEDURE — 1036F TOBACCO NON-USER: CPT | Performed by: INTERNAL MEDICINE

## 2025-06-03 PROCEDURE — 73030 X-RAY EXAM OF SHOULDER: CPT | Mod: RT

## 2025-06-03 PROCEDURE — 3078F DIAST BP <80 MM HG: CPT | Performed by: INTERNAL MEDICINE

## 2025-06-03 PROCEDURE — 99214 OFFICE O/P EST MOD 30 MIN: CPT | Performed by: INTERNAL MEDICINE

## 2025-06-03 PROCEDURE — 1170F FXNL STATUS ASSESSED: CPT | Performed by: INTERNAL MEDICINE

## 2025-06-03 PROCEDURE — 1159F MED LIST DOCD IN RCRD: CPT | Performed by: INTERNAL MEDICINE

## 2025-06-03 PROCEDURE — 1123F ACP DISCUSS/DSCN MKR DOCD: CPT | Performed by: INTERNAL MEDICINE

## 2025-06-03 PROCEDURE — 3008F BODY MASS INDEX DOCD: CPT | Performed by: INTERNAL MEDICINE

## 2025-06-03 PROCEDURE — 3074F SYST BP LT 130 MM HG: CPT | Performed by: INTERNAL MEDICINE

## 2025-06-03 PROCEDURE — 1157F ADVNC CARE PLAN IN RCRD: CPT | Performed by: INTERNAL MEDICINE

## 2025-06-03 PROCEDURE — G0444 DEPRESSION SCREEN ANNUAL: HCPCS | Performed by: INTERNAL MEDICINE

## 2025-06-03 PROCEDURE — G0442 ANNUAL ALCOHOL SCREEN 15 MIN: HCPCS | Performed by: INTERNAL MEDICINE

## 2025-06-03 PROCEDURE — 1158F ADVNC CARE PLAN TLK DOCD: CPT | Performed by: INTERNAL MEDICINE

## 2025-06-03 PROCEDURE — 1160F RVW MEDS BY RX/DR IN RCRD: CPT | Performed by: INTERNAL MEDICINE

## 2025-06-03 PROCEDURE — 99397 PER PM REEVAL EST PAT 65+ YR: CPT | Performed by: INTERNAL MEDICINE

## 2025-06-03 PROCEDURE — G2211 COMPLEX E/M VISIT ADD ON: HCPCS | Performed by: INTERNAL MEDICINE

## 2025-06-03 PROCEDURE — G0439 PPPS, SUBSEQ VISIT: HCPCS | Performed by: INTERNAL MEDICINE

## 2025-06-03 RX ORDER — GLIMEPIRIDE 4 MG/1
4 TABLET ORAL 2 TIMES DAILY
Qty: 180 TABLET | Refills: 3 | Status: SHIPPED | OUTPATIENT
Start: 2025-06-03 | End: 2026-06-03

## 2025-06-03 RX ORDER — ATORVASTATIN CALCIUM 20 MG/1
20 TABLET, FILM COATED ORAL NIGHTLY
Qty: 90 TABLET | Refills: 3 | Status: SHIPPED | OUTPATIENT
Start: 2025-06-03 | End: 2026-06-03

## 2025-06-03 ASSESSMENT — ACTIVITIES OF DAILY LIVING (ADL)
GROCERY_SHOPPING: INDEPENDENT
DRESSING: INDEPENDENT
TAKING_MEDICATION: INDEPENDENT
MANAGING_FINANCES: INDEPENDENT
BATHING: INDEPENDENT
DOING_HOUSEWORK: INDEPENDENT

## 2025-06-03 ASSESSMENT — ENCOUNTER SYMPTOMS
ARTHRALGIAS: 1
EYES NEGATIVE: 1
LOSS OF SENSATION IN FEET: 1
CARDIOVASCULAR NEGATIVE: 1
HEMATOLOGIC/LYMPHATIC NEGATIVE: 1
GASTROINTESTINAL NEGATIVE: 1
OCCASIONAL FEELINGS OF UNSTEADINESS: 0
DIZZINESS: 0
LIGHT-HEADEDNESS: 0
ENDOCRINE NEGATIVE: 1
CONSTITUTIONAL NEGATIVE: 1
DEPRESSION: 0
NEUROLOGICAL NEGATIVE: 1
RESPIRATORY NEGATIVE: 1
ROS SKIN COMMENTS: ITCHY
MUSCULOSKELETAL NEGATIVE: 1
PSYCHIATRIC NEGATIVE: 1
ALLERGIC/IMMUNOLOGIC NEGATIVE: 1
SHORTNESS OF BREATH: 0

## 2025-06-03 ASSESSMENT — PATIENT HEALTH QUESTIONNAIRE - PHQ9
1. LITTLE INTEREST OR PLEASURE IN DOING THINGS: NOT AT ALL
2. FEELING DOWN, DEPRESSED OR HOPELESS: NOT AT ALL
SUM OF ALL RESPONSES TO PHQ9 QUESTIONS 1 AND 2: 0

## 2025-06-03 NOTE — PROGRESS NOTES
Subjective   Patient ID: Phil Dickey is a 70 y.o. male who presents for Medicare Annual Wellness Visit Subsequent.  Patient presents today for Medicare wellness exam.    He continues follow up with Endo for management of DM.      He had colonoscopy in 8/2022 with finding of 2 tubular adenomas.  He will follow up with GI as indicated.    [facility] other than PSA which was recently done and at goal.    He voices no new or acute complaints today otherwise.  He states that he gets occasional discomfort in the right shoulder with no limitation in ROM.  He states this occurs about 10% of the time.        Review of Systems   Constitutional: Negative.    HENT: Negative.     Eyes: Negative.    Respiratory: Negative.     Cardiovascular: Negative.    Gastrointestinal: Negative.    Endocrine: Negative.    Genitourinary: Negative.    Musculoskeletal: Negative.    Skin: Negative.    Allergic/Immunologic: Negative.    Neurological: Negative.    Hematological: Negative.    Psychiatric/Behavioral: Negative.         Objective     Blood pressure 100/60, pulse 69, temperature 35.4 °C (95.7 °F), temperature source Temporal, weight 87.9 kg (193 lb 11.2 oz), SpO2 96%.   Physical Exam  Constitutional:       Appearance: Normal appearance.   HENT:      Head: Normocephalic and atraumatic.      Right Ear: External ear normal.      Left Ear: External ear normal.      Nose: Nose normal.   Eyes:      Conjunctiva/sclera: Conjunctivae normal.      Pupils: Pupils are equal, round, and reactive to light.   Neck:      Vascular: No carotid bruit.   Cardiovascular:      Rate and Rhythm: Normal rate and regular rhythm.      Pulses: Normal pulses.      Heart sounds: Normal heart sounds. No murmur heard.  Pulmonary:      Effort: Pulmonary effort is normal.      Breath sounds: Normal breath sounds. No wheezing or rales.   Abdominal:      General: Abdomen is flat. There is no distension.      Palpations: Abdomen is soft.       Tenderness: There is no abdominal tenderness. There is no right CVA tenderness or left CVA tenderness.   Musculoskeletal:         General: Normal range of motion.      Cervical back: Normal range of motion and neck supple.   Skin:     General: Skin is warm and dry.   Neurological:      General: No focal deficit present.      Mental Status: He is alert and oriented to person, place, and time.   Psychiatric:         Mood and Affect: Mood normal.         Behavior: Behavior normal.         Assessment/Plan   Problem List Items Addressed This Visit       Colon polyp    Diabetes mellitus, type 2 (Multi)    Medicare annual wellness visit, subsequent - Primary     Other Visit Diagnoses         Screening for malignant neoplasm of prostate        Relevant Orders    Prostate Specific Antigen, Screen      Screening for colorectal cancer          Right shoulder pain, unspecified chronicity        Relevant Orders    XR shoulder right 2+ views            Medicare annual wellness completed with no new findings or issues identified.  Patient has documented advanced care planning and POA in place  Depression screen is completed utilizing PHQ-2 with score of zero (0).  Alcohol screen is completed with no issues identified.   Physical exam completed as documented with no new findings.   DM managed by Endo with A1c at goal level.  Urine ACR is normal.  Labs per Endo as well as PSA reviewed with no issues identified.  Follow up colonoscopy as directed by GI which will be 5 years according to the report that I reviewed thus being due in 8/2027.  All of the above are reviewed with him.        Follow up annually or prn if needed

## 2025-06-05 ENCOUNTER — PATIENT MESSAGE (OUTPATIENT)
Dept: ENDOCRINOLOGY | Facility: CLINIC | Age: 70
End: 2025-06-05
Payer: MEDICARE

## 2025-06-08 NOTE — ASSESSMENT & PLAN NOTE
To continue Januvia 100mg once daily  To continue Glimepiride 4mg twice daily before breakfast and dinner   To continue Jardiance 25mg once daily   For a diabetic dilated eye examination  Counseled that the goal A1C should be 7% or less  Counseled glycemic control is warranted to prevent microvascular complications  For follow up in 12 months

## 2026-06-03 ENCOUNTER — APPOINTMENT (OUTPATIENT)
Dept: ENDOCRINOLOGY | Facility: CLINIC | Age: 71
End: 2026-06-03
Payer: MEDICARE

## 2026-06-04 ENCOUNTER — APPOINTMENT (OUTPATIENT)
Dept: PRIMARY CARE | Facility: CLINIC | Age: 71
End: 2026-06-04
Payer: MEDICARE

## 2026-06-04 ENCOUNTER — APPOINTMENT (OUTPATIENT)
Dept: ENDOCRINOLOGY | Facility: CLINIC | Age: 71
End: 2026-06-04
Payer: MEDICARE